# Patient Record
Sex: MALE | Race: OTHER | Employment: FULL TIME | URBAN - METROPOLITAN AREA
[De-identification: names, ages, dates, MRNs, and addresses within clinical notes are randomized per-mention and may not be internally consistent; named-entity substitution may affect disease eponyms.]

---

## 2017-03-07 ENCOUNTER — TELEPHONE (OUTPATIENT)
Dept: FAMILY MEDICINE CLINIC | Facility: CLINIC | Age: 42
End: 2017-03-07

## 2017-03-08 ENCOUNTER — OFFICE VISIT (OUTPATIENT)
Dept: FAMILY MEDICINE CLINIC | Facility: CLINIC | Age: 42
End: 2017-03-08

## 2017-03-08 ENCOUNTER — HOSPITAL ENCOUNTER (OUTPATIENT)
Dept: GENERAL RADIOLOGY | Age: 42
Discharge: HOME | End: 2017-03-08
Attending: PHYSICIAN ASSISTANT
Payer: MEDICAID

## 2017-03-08 VITALS
DIASTOLIC BLOOD PRESSURE: 78 MMHG | SYSTOLIC BLOOD PRESSURE: 122 MMHG | TEMPERATURE: 98 F | OXYGEN SATURATION: 96 % | HEIGHT: 67 IN | HEART RATE: 60 BPM | WEIGHT: 165 LBS | BODY MASS INDEX: 25.9 KG/M2

## 2017-03-08 DIAGNOSIS — S93.402A MODERATE LEFT ANKLE SPRAIN, INITIAL ENCOUNTER: ICD-10-CM

## 2017-03-08 DIAGNOSIS — M79.672 LEFT FOOT PAIN: ICD-10-CM

## 2017-03-08 DIAGNOSIS — S93.402A MODERATE LEFT ANKLE SPRAIN, INITIAL ENCOUNTER: Primary | ICD-10-CM

## 2017-03-08 PROCEDURE — 73630 X-RAY EXAM OF FOOT: CPT

## 2017-03-08 PROCEDURE — 73610 X-RAY EXAM OF ANKLE: CPT

## 2017-03-08 PROCEDURE — 99203 OFFICE O/P NEW LOW 30 MIN: CPT | Performed by: PHYSICIAN ASSISTANT

## 2017-03-08 NOTE — PATIENT INSTRUCTIONS
-Ankle brace during day x 1 week  -RICE as below  -Follow up with Dr. Dwight Sherman within the next week  -Ibuprofen as needed for pain      Ankle Sprain (Adult)  An ankle sprain is a stretching or tearing of the ligaments that hold the ankle joint together.  Jerry Barcenas · Ice should be used right away to help control swelling. Place an ice pack over the injured area for 20 minutes. Do this every 3 to 6 hours for the first 24 to 48 hours. Keep using ice packs to ease pain and swelling as needed.  To make an ice pack, put ic · You may use over-the-counter pain medicine (NSAIDS or nonsteroidal anti-inflammatory drugs) to control pain, unless another pain medicine was prescribed.  Talk with your provider before using these medicines if you have chronic liver or kidney disease, or · The skin is discolored (looks blue, purple, or gray), has blisters, or is irritated  · You re-injure your ankle  Date Last Reviewed: 11/20/2015  © 5172-1172 The [address] [place] [address], [address] [place] [address]. All rights reserved.  This i

## 2017-03-08 NOTE — PROGRESS NOTES
Carmina Nolasco is a 46year old male. HPI:   The patient complains of  left ankle pain. Sx's started  1 year  Ago. Pt rolled his ankle 1 year ago while playing football and has made ever since.   Pt reports he stands at work and his ankle has been aggrevat Study Result      PROCEDURE:  XR FOOT, COMPLETE (MIN 3 VIEWS), LEFT (CPT=73630)      TECHNIQUE:  AP, oblique, and lateral views were obtained.      COMPARISON:  [place], XR ANKLE (MIN 3 VIEWS), LEFT (CPT=73610), 3/08/2017, 10:57.      INDICATIONS:            ASSESSMENT AND PLAN:   ASSESSMENT:  Diagnoses and all orders for this visit:    Moderate left ankle sprain, initial encounter  -     Cancel: XR ANKLE (MIN 3 VIEWS), LEFT (CPT=73610); Future  -     XR ANKLE (MIN 3 VIEWS), LEFT (CPT=73610);  Future Your healthcare provider may order X-rays to be sure you don’t have a fracture, or broken bone. The injured area will feel sore. Swelling and pain may make it hard to walk. You may need crutches if walking is painful.  Or your provider may have you use a · After 48 hours, it may be helpful to apply heat for 20 minutes several times a day. You can do this with a heating pad or warm compress. Or you may want to go back and forth between using ice and heat. Never apply heat directly to the skin.  Always wrap t · The cast has a bad smell  · The plaster cast or splint gets wet or soft  · The fiberglass cast or splint gets wet and does not dry for 24 hours  · The pain or swelling increases, or redness appears  · Your toes become cold, blue, numb, or tingly  · The s

## 2017-03-17 ENCOUNTER — OFFICE VISIT (OUTPATIENT)
Dept: FAMILY MEDICINE CLINIC | Facility: CLINIC | Age: 42
End: 2017-03-17

## 2017-03-17 VITALS
WEIGHT: 174 LBS | BODY MASS INDEX: 27 KG/M2 | HEART RATE: 82 BPM | RESPIRATION RATE: 14 BRPM | OXYGEN SATURATION: 97 % | DIASTOLIC BLOOD PRESSURE: 80 MMHG | TEMPERATURE: 98 F | SYSTOLIC BLOOD PRESSURE: 120 MMHG

## 2017-03-17 DIAGNOSIS — S93.402D SPRAIN OF LEFT ANKLE, UNSPECIFIED LIGAMENT, SUBSEQUENT ENCOUNTER: ICD-10-CM

## 2017-03-17 DIAGNOSIS — G89.29 CHRONIC PAIN OF LEFT ANKLE: ICD-10-CM

## 2017-03-17 DIAGNOSIS — M25.572 CHRONIC PAIN OF LEFT ANKLE: ICD-10-CM

## 2017-03-17 PROBLEM — S93.402A SPRAIN OF LEFT ANKLE: Status: ACTIVE | Noted: 2017-03-17

## 2017-03-17 PROCEDURE — 99214 OFFICE O/P EST MOD 30 MIN: CPT | Performed by: EMERGENCY MEDICINE

## 2017-03-17 RX ORDER — IBUPROFEN 800 MG/1
800 TABLET ORAL EVERY 8 HOURS PRN
Qty: 30 TABLET | Refills: 0 | Status: SHIPPED | OUTPATIENT
Start: 2017-03-17

## 2017-03-17 NOTE — PROGRESS NOTES
Chief Complaint:   Patient presents with: Follow - Up: F/u left ankle pain. HPI:   This is a 46year old male     Here for follow up of left ankle pain  Has had ankle pain for the past 1 year. ! Year ago rolled on left ankle.  Has had pain to left ankl Achilles tendon intact without any evidence of rupture      ASSESSMENT AND PLAN:   Diagnoses and all orders for this visit:    Chronic pain of left ankle    -     MRI ANKLE, LEFT (CPT=73721); Future  -     ibuprofen 800 MG Oral Tab;  Take 1 tablet (800 mg t

## 2017-03-17 NOTE — PATIENT INSTRUCTIONS
Thank you for choosing [facility]  To Do:  FOR SHANNAN SALAZAR  1. Comfortable foot wear  2. Use air cast for comfort  3. Arrange for MRI of left ankle  4. Take Ibuprofen for pain  5. Arrange for physical therapy  6.  Follow up in 1 month after sever heart. This helps reduce swelling. · Wrapping the ankle with an elastic bandage or ankle brace. This helps reduce swelling and gives some support to the ankle. In rare cases, you may need a cast or boot. · Stretching and other exercises.  These improve fl reduce swelling, keep your ankle raised above your heart when you sit or lie down. Medicine  Your healthcare provider may suggest oral non-steroidal anti-inflammatory medicine (NSAIDs), such as ibuprofen.  This relieves the pain and helps reduce any swelli

## 2017-03-20 ENCOUNTER — TELEPHONE (OUTPATIENT)
Dept: FAMILY MEDICINE CLINIC | Facility: CLINIC | Age: 42
End: 2017-03-20

## 2017-03-20 NOTE — TELEPHONE ENCOUNTER
Below message received from referral dept regarding patient's MRI of ankle:      Good afternoon,                Per call rcvd from [facility] this request was denied, it did not meet criteria.  Physician can call [phone] provide tracking #: [id number] &

## 2017-04-03 NOTE — TELEPHONE ENCOUNTER
pls have patient follow up with orthopedics or podiatry  Pt needs to call nayana to see who is covered  Continue with PT for now

## 2017-04-03 NOTE — TELEPHONE ENCOUNTER
Called patient, went over POC below. Pt is aware to continue PT (pt stated his first appt is Tuesday) and to call [facility].  Advised pt to figure out which orthopedic and podiatrist are covered via the insurance and then call the schedule with orthopedics

## 2017-04-06 ENCOUNTER — APPOINTMENT (OUTPATIENT)
Dept: PHYSICAL THERAPY | Age: 42
End: 2017-04-06
Attending: EMERGENCY MEDICINE
Payer: MEDICAID

## 2017-04-06 ENCOUNTER — OFFICE VISIT (OUTPATIENT)
Dept: PHYSICAL THERAPY | Age: 42
End: 2017-04-06
Attending: EMERGENCY MEDICINE
Payer: MEDICAID

## 2017-04-06 DIAGNOSIS — G89.29 CHRONIC PAIN OF LEFT ANKLE: Primary | ICD-10-CM

## 2017-04-06 DIAGNOSIS — M25.572 CHRONIC PAIN OF LEFT ANKLE: Primary | ICD-10-CM

## 2017-04-06 PROCEDURE — 97162 PT EVAL MOD COMPLEX 30 MIN: CPT | Performed by: PHYSICAL THERAPIST

## 2017-04-06 PROCEDURE — 97110 THERAPEUTIC EXERCISES: CPT | Performed by: PHYSICAL THERAPIST

## 2017-04-06 NOTE — PROGRESS NOTES
LOWER EXTREMITY EVALUATION:   Referring Physician: Dr. Dwight Sherman  Diagnosis: Chronic pain of left ankle (M25.572,G89.29) Date of Service: 4/6/2017     PATIENT Adi Nolan is a 46year old y/o male who presents to therapy today with complaints of Treatment and Response:   Evaluation followed by patient education provided on ankle biomechanics. Patient was instructed in and issued a HEP for: Gastrocnemius stretch.   He was advised that there should be no lasting pain provocation after exercise, othe therapist: Brown Bailey, PT    [de-identified] certification required: Yes  I certify the need for these services furnished under this plan of treatment and while under my care.     X___________________________________________________ Date____________________

## 2017-04-11 ENCOUNTER — APPOINTMENT (OUTPATIENT)
Dept: PHYSICAL THERAPY | Age: 42
End: 2017-04-11
Attending: EMERGENCY MEDICINE
Payer: MEDICAID

## 2017-04-13 ENCOUNTER — APPOINTMENT (OUTPATIENT)
Dept: PHYSICAL THERAPY | Age: 42
End: 2017-04-13
Attending: EMERGENCY MEDICINE
Payer: MEDICAID

## 2017-04-13 ENCOUNTER — OFFICE VISIT (OUTPATIENT)
Dept: PHYSICAL THERAPY | Age: 42
End: 2017-04-13
Attending: EMERGENCY MEDICINE
Payer: MEDICAID

## 2017-04-13 DIAGNOSIS — G89.29 CHRONIC PAIN OF LEFT ANKLE: Primary | ICD-10-CM

## 2017-04-13 DIAGNOSIS — M25.572 CHRONIC PAIN OF LEFT ANKLE: Primary | ICD-10-CM

## 2017-04-13 PROCEDURE — 97110 THERAPEUTIC EXERCISES: CPT | Performed by: PHYSICAL THERAPIST

## 2017-04-13 PROCEDURE — 97112 NEUROMUSCULAR REEDUCATION: CPT | Performed by: PHYSICAL THERAPIST

## 2017-04-13 NOTE — PROGRESS NOTES
Dx: Chronic pain of left ankle (M25.572,G89.29)  Authorized # of Visits: 2/7 Next MD visit: none scheduled  Fall Risk: standard Precautions: n/a    Subjective: Patient reports left posterior and lateral ankle pain almost 10/10.   He states that his ankle fe

## 2017-04-18 ENCOUNTER — OFFICE VISIT (OUTPATIENT)
Dept: PHYSICAL THERAPY | Age: 42
End: 2017-04-18
Attending: EMERGENCY MEDICINE
Payer: MEDICAID

## 2017-04-18 DIAGNOSIS — G89.29 CHRONIC PAIN OF LEFT ANKLE: Primary | ICD-10-CM

## 2017-04-18 DIAGNOSIS — M25.572 CHRONIC PAIN OF LEFT ANKLE: Primary | ICD-10-CM

## 2017-04-18 PROCEDURE — 97112 NEUROMUSCULAR REEDUCATION: CPT | Performed by: PHYSICAL THERAPIST

## 2017-04-18 PROCEDURE — 97140 MANUAL THERAPY 1/> REGIONS: CPT | Performed by: PHYSICAL THERAPIST

## 2017-04-18 NOTE — PROGRESS NOTES
Dx: Chronic pain of left ankle (M25.572,G89.29)  Authorized # of Visits: 3/7  Next MD visit: none scheduled  Fall Risk: standard  Precautions: n/a    Subjective: Patient states that his ankle felt better with the tape from last visit.   He reports left post progression    Charges: MT 1 (10 min), NMR 2 (30 min) Total Timed Treatment: 40 min Total Treatment Time: 40 min

## 2017-04-21 ENCOUNTER — OFFICE VISIT (OUTPATIENT)
Dept: PHYSICAL THERAPY | Age: 42
End: 2017-04-21
Attending: EMERGENCY MEDICINE
Payer: MEDICAID

## 2017-04-21 DIAGNOSIS — G89.29 CHRONIC PAIN OF LEFT ANKLE: Primary | ICD-10-CM

## 2017-04-21 DIAGNOSIS — M25.572 CHRONIC PAIN OF LEFT ANKLE: Primary | ICD-10-CM

## 2017-04-21 PROCEDURE — 97112 NEUROMUSCULAR REEDUCATION: CPT | Performed by: PHYSICAL THERAPIST

## 2017-04-21 PROCEDURE — 97140 MANUAL THERAPY 1/> REGIONS: CPT | Performed by: PHYSICAL THERAPIST

## 2017-04-21 NOTE — PROGRESS NOTES
Dx: Chronic pain of left ankle (M25.572,G89.29)  Authorized # of Visits: 4/7  Next MD visit: none scheduled  Fall Risk: standard  Precautions: n/a    Subjective: Patient states that his ankle felt better with the tape from last visit.   He reports left post Skilled Services: mobilization and taping to relieve pain and improve mobility, exercise/ activity progression    Charges: MT 1 (10 min), NMR 2 (30 min) Total Timed Treatment: 40 min Total Treatment Time: 40 min

## 2017-04-25 ENCOUNTER — OFFICE VISIT (OUTPATIENT)
Dept: PHYSICAL THERAPY | Age: 42
End: 2017-04-25
Attending: EMERGENCY MEDICINE
Payer: MEDICAID

## 2017-04-25 DIAGNOSIS — M25.572 CHRONIC PAIN OF LEFT ANKLE: Primary | ICD-10-CM

## 2017-04-25 DIAGNOSIS — G89.29 CHRONIC PAIN OF LEFT ANKLE: Primary | ICD-10-CM

## 2017-04-25 PROCEDURE — 97112 NEUROMUSCULAR REEDUCATION: CPT | Performed by: PHYSICAL THERAPIST

## 2017-04-25 PROCEDURE — 97140 MANUAL THERAPY 1/> REGIONS: CPT | Performed by: PHYSICAL THERAPIST

## 2017-04-25 NOTE — PROGRESS NOTES
Dx: Chronic pain of left ankle (M25.572,G89.29)  Authorized # of Visits: 5/7  Next MD visit: none scheduled  Fall Risk: standard  Precautions: n/a    Subjective: Patient states that his ankle felt better with the tape from last visit.   He reports left post throw/ catch 1 kg ball on rebounder SLS left on BOSU; on dynadisk with throw/ catch 1 kg ball on rebounder 5' SLS left on BOSU; on dynadisk with throw/ catch 1 kg ball on rebounder 5'         Bilateral heel raises on 6\" step X 20                 Skilled S

## 2017-04-27 ENCOUNTER — OFFICE VISIT (OUTPATIENT)
Dept: PHYSICAL THERAPY | Age: 42
End: 2017-04-27
Attending: EMERGENCY MEDICINE
Payer: MEDICAID

## 2017-04-27 DIAGNOSIS — G89.29 CHRONIC PAIN OF LEFT ANKLE: Primary | ICD-10-CM

## 2017-04-27 DIAGNOSIS — M25.572 CHRONIC PAIN OF LEFT ANKLE: Primary | ICD-10-CM

## 2017-04-27 PROCEDURE — 97112 NEUROMUSCULAR REEDUCATION: CPT | Performed by: PHYSICAL THERAPIST

## 2017-04-27 PROCEDURE — 97110 THERAPEUTIC EXERCISES: CPT | Performed by: PHYSICAL THERAPIST

## 2017-04-27 NOTE — PROGRESS NOTES
Dx: Chronic pain of left ankle (M25.572,G89.29)  Authorized # of Visits: 6/7  Next MD visit: none scheduled  Fall Risk: standard  Precautions: n/a    Subjective: Patient states that he played basketball for 1 hour yesterday which is the longest he's been a on half foam roll, round side down, left and right X 2 minutes   --------------- Tandem stance on half foam roll, round side down, left and right X 2 minutes Tandem walk forward and backward on floor or foam pad 10' X 2 each     Marching to SLS on foam pad

## 2017-05-02 ENCOUNTER — APPOINTMENT (OUTPATIENT)
Dept: PHYSICAL THERAPY | Age: 42
End: 2017-05-02
Payer: MEDICAID

## 2017-05-05 ENCOUNTER — OFFICE VISIT (OUTPATIENT)
Dept: PHYSICAL THERAPY | Age: 42
End: 2017-05-05
Attending: EMERGENCY MEDICINE
Payer: MEDICAID

## 2017-05-05 DIAGNOSIS — G89.29 CHRONIC PAIN OF LEFT ANKLE: Primary | ICD-10-CM

## 2017-05-05 DIAGNOSIS — M25.572 CHRONIC PAIN OF LEFT ANKLE: Primary | ICD-10-CM

## 2017-05-05 PROCEDURE — 97110 THERAPEUTIC EXERCISES: CPT | Performed by: PHYSICAL THERAPIST

## 2017-05-05 NOTE — PROGRESS NOTES
Discharge Summary    Dx: Chronic pain of left ankle (M25.572,G89.29)  Authorized # of Visits: 7/7  Next MD visit: none scheduled  Fall Risk: standard  Precautions: n/a    Dear Dr. Caleb Chen,    Thank you for the referral of Ray to physical therapy.   He has Potential: good    Plan: Discharge from physical therapy with independent home program.    Thank you for your referral. If you have any questions, please contact me at Dept: [phone].     Sincerely,  Electronically signed by therapist: Chio Salazar PT foam pad 10' X 2 each     Marching to SLS on foam pad X 4 minutes SLS left on BOSU; on dynadisk with throw/ catch 1 kg ball on rebounder SLS left on BOSU; on dynadisk with throw/ catch 1 kg ball on rebounder 5' SLS left on BOSU; on dynadisk with throw/ cat

## 2017-12-22 ENCOUNTER — HOSPITAL ENCOUNTER (EMERGENCY)
Age: 42
Discharge: HOME | End: 2017-12-22
Payer: OTHER MISCELLANEOUS

## 2017-12-22 VITALS
OXYGEN SATURATION: 97 % | SYSTOLIC BLOOD PRESSURE: 130 MMHG | HEIGHT: 67 IN | TEMPERATURE: 98 F | BODY MASS INDEX: 26.68 KG/M2 | RESPIRATION RATE: 20 BRPM | WEIGHT: 170 LBS | DIASTOLIC BLOOD PRESSURE: 83 MMHG | HEART RATE: 76 BPM

## 2017-12-22 DIAGNOSIS — M62.830 PARASPINAL MUSCLE SPASM: Primary | ICD-10-CM

## 2017-12-22 PROCEDURE — 99282 EMERGENCY DEPT VISIT SF MDM: CPT

## 2017-12-22 RX ORDER — IBUPROFEN 600 MG/1
600 TABLET ORAL ONCE
Status: COMPLETED | OUTPATIENT
Start: 2017-12-22 | End: 2017-12-22

## 2017-12-23 NOTE — ED PROVIDER NOTES
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 All other systems reviewed and negative except as noted above.     Physical Exam   ED Triage Vitals [12/22/17 2345]  BP: 130/83  Pulse: 76  Resp: 20  Temp: (!) 97.5 °F (36.4 °C)  Temp src: n/a  SpO2: 97 %  O2 Device: None (Room air)    Niay encounter diagnosis)    Disposition:  Discharge  12/22/2017  2:59 pm    Follow-up:  [address]  [address]  [phone]  In 1 day          Medications Prescribed:  Discharge Medication List

## 2018-07-15 ENCOUNTER — IMAGING SERVICES (OUTPATIENT)
Dept: OTHER | Age: 43
End: 2018-07-15

## 2018-07-15 ENCOUNTER — CHARTING TRANS (OUTPATIENT)
Dept: OTHER | Age: 43
End: 2018-07-15

## 2018-07-16 ENCOUNTER — CHARTING TRANS (OUTPATIENT)
Dept: OTHER | Age: 43
End: 2018-07-16

## 2018-07-20 ENCOUNTER — CHARTING TRANS (OUTPATIENT)
Dept: OTHER | Age: 43
End: 2018-07-20

## 2018-07-24 ENCOUNTER — CHARTING TRANS (OUTPATIENT)
Dept: OTHER | Age: 43
End: 2018-07-24

## 2018-07-31 ENCOUNTER — CHARTING TRANS (OUTPATIENT)
Dept: OTHER | Age: 43
End: 2018-07-31

## (undated) NOTE — MR AVS SNAPSHOT
[facility]  [address] [address] [phone]               Thank you for choosing us for your health care visit with Elysa Hamman, PT. We are glad to serve you and happy to provide you with this summary of your visit.   Please Sign up for MOOVIAt, your secure online medical record. [employer] will allow you to access patient instructions from your recent visit,  view other health information, and more. To sign up or find more information, go to [email or web address] and cl

## (undated) NOTE — Clinical Note
Patient Name: Wilfrido Vences  YOB: 1975          MRN number:  [id number]  Date:  4/6/2017  Referring Physician:  Ember Wray MD       LOWER EXTREMITY EVALUATION: Diagnosis: Chronic pain of left ankle (M25.572,G89.29) Date of Service: 4/6/ Posterior Drawer Test: R negative, L negative  Inversion Stress Test: R negative, L negative    Balance: SLS: R 15 sec, L 4 sec    Today’s Treatment and Response:   Evaluation followed by patient education provided on ankle biomechanics.   Patient was instr via fax as soon as possible to [phone]. If you have any questions, please contact me at Dept: [phone].     Sincerely,  Electronically signed by therapist: Steve Owens, PT    [de-identified] certification required: Yes  I certify the need for these s

## (undated) NOTE — MR AVS SNAPSHOT
[address]  [address]  [place] [address] [address]  [phone]               Thank you for choosing us for your health care visit with Doreen Sandoval MD.  We are glad to serve you and happy to provide you with this summary of insurance requires you to obtain a managed care referral, please allow 3 working days from the date of this order for the referral to be processed. Please wait 3 working days to schedule your appointment unless notified.       Marcella Cerda in [place] The ankle is the joint where the leg and foot meet. Bones are held in place by connective tissue called ligaments. When ankle ligaments are stretched to the point of pain and injury, it is called an ankle sprain. A sprain can tear the ligaments.  These tear Possible complications of an ankle sprain  An ankle that has been weakened by a sprain can be more likely to have repeated sprains afterward. Doing exercises to strengthen your ankle and improve balance can reduce your risk for repeated sprains.  Other poss After 3 days, soak your ankle in warm water for 30 seconds, then in cool water for 30 seconds. Go back and forth for 5 minutes. Doing this every 2 hours will help keep the swelling down.   Exercises    After about 2 to 3 weeks, you may be given exercises to Sign up for Neomobilet, your secure online medical record. [employer] will allow you to access patient instructions from your recent visit,  view other health information, and more. To sign up or find more information, go to [email or web address] and cl

## (undated) NOTE — Clinical Note
Patient Name: Elo Singh  YOB: 1975          MRN number:  [id number]  Date:  5/5/2017  Referring Physician:  Yuly Torres MD    Discharge Summary  Dx: Chronic pain of left ankle (M25.572,G89.29)  Authorized # of Visits: 7/7 Next MD vis prolonged standing and walking- partially met, decreased pain reported working 12 hour days  · Pt will be independent and compliant with comprehensive HEP to maintain progress achieved in PT- met    Rehab Potential: good    Plan: Discharge from physical th

## (undated) NOTE — MR AVS SNAPSHOT
[facility]  [address] [address]  [phone]               Thank you for choosing us for your health care visit with Emmanuel Izquierdo PT. We are glad to serve you and happy to provide you with this summary of your visit.   Please testing room. Parents will remain in the MRI waiting area and be reunited with the child upon completion of the MRI.               Medical Issues Discussed Today     Chronic pain of left ankle      Instructions and Information about Your Health     None

## (undated) NOTE — MR AVS SNAPSHOT
[facility]  [address] [address] [phone]               Thank you for choosing us for your health care visit with Cristela Mckenna PT. We are glad to serve you and happy to provide you with this summary of your visit.   Please information, go to [email or web address] and click on the Sign Up Now link in the [employer] box. Enter your [employer] Activation Code exactly as it appears below along with your Zip Code and Date of Birth to complete the sign-up process.  If you do

## (undated) NOTE — MR AVS SNAPSHOT
[address] [facility] [address] [phone]               Thank you for choosing us for your health care visit with ADINA Calle.   We are glad to serve you and happy to provide you with this summary of y Ankle Sprain (Adult)  An ankle sprain is a stretching or tearing of the ligaments that hold the ankle joint together. There are no broken bones. An ankle sprain is a common injury for both children and adults.  It happens when the ankle turns, twists, or r as needed. To make an ice pack, put ice cubes in a plastic bag that seals at the top. Wrap the bag in a clean, thin towel or cloth. Never put ice or an ice pack directly on the skin. The ice pack can be put right on the cast, bandage, or splint.  As the ice is helpful in preventing long-term ankle problems. Prevention  To help prevent ankle sprains, it’s important to have good strength, balance, and flexibility.  Be sure to:  · Always warm up before you exercise or do something very active  · Be careful when 122/78 mmHg 60 98.3 °F (36.8 °C) (Oral) 67\" 165 lb 25.84 kg/m2         Current Medications      Notice  As of 3/8/2017 10:35 AM    You have not been prescribed any medications.             [employer]     Sign up for [employer], your secure online medical record HOW TO GET STARTED: HOW TO STAY MOTIVATED:   Start activities slowly and build up over time Do what you like   Get your heart pumping – brisk walking, biking, swimming Even 10 minute increments are effective and add up over the week   2 ½ hours per week –

## (undated) NOTE — MR AVS SNAPSHOT
[facility]  [address] [address]  [phone]               Thank you for choosing us for your health care visit with Chio Salazar PT. We are glad to serve you and happy to provide you with this summary of your visit.   Please testing room. Parents will remain in the MRI waiting area and be reunited with the child upon completion of the MRI.             Apr 11, 2017  2:15 PM   PT VISIT BY THERAPIST with Brown Bailey PT   THE [facility] [place] ([facility])    1 your Zip Code and Date of Birth to complete the sign-up process. If you do not sign up before the expiration date, you must request a new code.     Your unique [email or web address].me Access Code: Hallie Lopez  Expires: 5/7/2017 11:35 AM    If you have questions, you can ca

## (undated) NOTE — MR AVS SNAPSHOT
[facility]  [address] [address] [phone]               Thank you for choosing us for your health care visit with Sanchez Thao PT. We are glad to serve you and happy to provide you with this summary of your visit.   Please ibuprofen 800 MG Tabs   Take 1 tablet (800 mg total) by mouth every 8 (eight) hours as needed for Pain. Commonly known as:  MOTRIN                   [employer]     Sign up for coramaze technologies, your secure online medical record.   [employer] technologies will allow you to access pa

## (undated) NOTE — ED AVS SNAPSHOT
Jere Heard   MRN: [id number]    Department:  THE [facility] Emergency Department in [place]   Date of Visit:  12/22/2017           Disclosure     Insurance plans vary and the physician(s) referred by the ER may not be covered by your plan.  Please contact tell this physician (or your personal doctor if your instructions are to return to your personal doctor) about any new or lasting problems. The primary care or specialist physician will see patients referred from the [facility] Emergency Department.  Arthor Bernheim

## (undated) NOTE — LETTER
December 22, 2017    Patient: Melissa Miles   Date of Visit: 12/22/2017       To Whom It May Concern:    Radha Bustamante was seen and treated in our emergency department on 12/22/2017. He should not return to work until 12/24/17.     If you have any questions

## (undated) NOTE — MR AVS SNAPSHOT
[facility]  [address] [address]  [phone]               Thank you for choosing us for your health care visit with Todd Espino PT. We are glad to serve you and happy to provide you with this summary of your visit.   Please your Zip Code and Date of Birth to complete the sign-up process. If you do not sign up before the expiration date, you must request a new code.     Your unique Diagnose.me Access Code: Kristin Lopez  Expires: 5/7/2017 11:35 AM    If you have questions, you can ca

## (undated) NOTE — MR AVS SNAPSHOT
[facility]  [address] [address]  [phone]               Thank you for choosing us for your health care visit with Caro [facility] PT. We are glad to serve you and happy to provide you with this summary of your visit.   Please injection, you need to stop breastfeeding for 24-48 hours after the procedure. IF A CHILD is scheduled for this procedure, parents should be advised that they will not be able to accompany the child in the testing room.  Parents will remain in the MRI w